# Patient Record
Sex: FEMALE | Race: WHITE | Employment: OTHER | URBAN - METROPOLITAN AREA
[De-identification: names, ages, dates, MRNs, and addresses within clinical notes are randomized per-mention and may not be internally consistent; named-entity substitution may affect disease eponyms.]

---

## 2018-05-04 ENCOUNTER — OFFICE VISIT (OUTPATIENT)
Dept: ORTHOPEDIC SURGERY | Age: 58
End: 2018-05-04

## 2018-05-04 VITALS
DIASTOLIC BLOOD PRESSURE: 80 MMHG | BODY MASS INDEX: 32.56 KG/M2 | WEIGHT: 202.6 LBS | SYSTOLIC BLOOD PRESSURE: 135 MMHG | HEIGHT: 66 IN | HEART RATE: 78 BPM

## 2018-05-04 DIAGNOSIS — M25.562 LEFT KNEE PAIN, UNSPECIFIED CHRONICITY: Primary | ICD-10-CM

## 2018-05-04 DIAGNOSIS — M17.12 PRIMARY OSTEOARTHRITIS OF LEFT KNEE: ICD-10-CM

## 2018-05-04 DIAGNOSIS — M23.204 DEGENERATIVE TEAR OF MEDIAL MENISCUS OF LEFT KNEE: ICD-10-CM

## 2018-05-04 PROCEDURE — 99203 OFFICE O/P NEW LOW 30 MIN: CPT | Performed by: NURSE PRACTITIONER

## 2018-05-04 PROCEDURE — L1810 KO ELASTIC WITH JOINTS: HCPCS | Performed by: NURSE PRACTITIONER

## 2018-05-04 RX ORDER — ATORVASTATIN CALCIUM 10 MG/1
10 TABLET, FILM COATED ORAL DAILY
Status: ON HOLD | COMMUNITY
End: 2022-09-27 | Stop reason: HOSPADM

## 2018-05-04 RX ORDER — IBUPROFEN 200 MG
200 TABLET ORAL EVERY 6 HOURS PRN
Qty: 120 TABLET | Refills: 0 | Status: ON HOLD | OUTPATIENT
Start: 2018-05-04 | End: 2022-09-27 | Stop reason: HOSPADM

## 2018-05-04 RX ORDER — GLIMEPIRIDE 4 MG/1
4 TABLET ORAL
Status: ON HOLD | COMMUNITY
End: 2022-09-27 | Stop reason: HOSPADM

## 2018-05-04 RX ORDER — INSULIN GLARGINE 100 [IU]/ML
INJECTION, SOLUTION SUBCUTANEOUS NIGHTLY
Status: ON HOLD | COMMUNITY
End: 2022-09-27 | Stop reason: HOSPADM

## 2018-05-04 RX ORDER — LISINOPRIL 10 MG/1
10 TABLET ORAL DAILY
Status: ON HOLD | COMMUNITY
End: 2022-09-27 | Stop reason: HOSPADM

## 2018-05-23 ENCOUNTER — OFFICE VISIT (OUTPATIENT)
Dept: ORTHOPEDIC SURGERY | Age: 58
End: 2018-05-23

## 2018-05-23 VITALS — WEIGHT: 210 LBS | HEIGHT: 66 IN | BODY MASS INDEX: 33.75 KG/M2

## 2018-05-23 DIAGNOSIS — M17.12 PRIMARY OSTEOARTHRITIS OF LEFT KNEE: ICD-10-CM

## 2018-05-23 DIAGNOSIS — M79.671 RIGHT FOOT PAIN: ICD-10-CM

## 2018-05-23 DIAGNOSIS — S83.232A COMPLEX TEAR OF MEDIAL MENISCUS OF LEFT KNEE AS CURRENT INJURY, INITIAL ENCOUNTER: Primary | ICD-10-CM

## 2018-05-23 PROCEDURE — 99213 OFFICE O/P EST LOW 20 MIN: CPT | Performed by: PHYSICIAN ASSISTANT

## 2018-05-23 PROCEDURE — G8428 CUR MEDS NOT DOCUMENT: HCPCS | Performed by: PHYSICIAN ASSISTANT

## 2018-05-23 PROCEDURE — 3017F COLORECTAL CA SCREEN DOC REV: CPT | Performed by: PHYSICIAN ASSISTANT

## 2018-05-23 PROCEDURE — G8417 CALC BMI ABV UP PARAM F/U: HCPCS | Performed by: PHYSICIAN ASSISTANT

## 2018-05-23 PROCEDURE — 4004F PT TOBACCO SCREEN RCVD TLK: CPT | Performed by: PHYSICIAN ASSISTANT

## 2022-09-26 ENCOUNTER — APPOINTMENT (OUTPATIENT)
Dept: CT IMAGING | Age: 62
DRG: 134 | End: 2022-09-26
Payer: COMMERCIAL

## 2022-09-26 ENCOUNTER — HOSPITAL ENCOUNTER (INPATIENT)
Age: 62
LOS: 2 days | Discharge: HOME | DRG: 134 | End: 2022-09-28
Attending: EMERGENCY MEDICINE | Admitting: INTERNAL MEDICINE
Payer: COMMERCIAL

## 2022-09-26 DIAGNOSIS — I82.402 ACUTE DEEP VEIN THROMBOSIS (DVT) OF LEFT LOWER EXTREMITY, UNSPECIFIED VEIN (HCC): ICD-10-CM

## 2022-09-26 DIAGNOSIS — E87.20 METABOLIC ACIDOSIS: ICD-10-CM

## 2022-09-26 DIAGNOSIS — E11.65 HYPERGLYCEMIA DUE TO DIABETES MELLITUS (HCC): ICD-10-CM

## 2022-09-26 DIAGNOSIS — I26.94 MULTIPLE SUBSEGMENTAL PULMONARY EMBOLI WITHOUT ACUTE COR PULMONALE (HCC): Primary | ICD-10-CM

## 2022-09-26 PROBLEM — I26.99 BILATERAL PULMONARY EMBOLISM (HCC): Status: ACTIVE | Noted: 2022-09-26

## 2022-09-26 LAB
A/G RATIO: 1.1 (ref 1.1–2.2)
ALBUMIN SERPL-MCNC: 3.7 G/DL (ref 3.4–5)
ALP BLD-CCNC: 104 U/L (ref 40–129)
ALT SERPL-CCNC: 13 U/L (ref 10–40)
ANION GAP SERPL CALCULATED.3IONS-SCNC: 14 MMOL/L (ref 3–16)
APTT: 27.4 SEC (ref 23–34.3)
AST SERPL-CCNC: 15 U/L (ref 15–37)
BASE EXCESS VENOUS: -2.6 MMOL/L (ref -3–3)
BASOPHILS ABSOLUTE: 0.1 K/UL (ref 0–0.2)
BASOPHILS RELATIVE PERCENT: 1.1 %
BETA-HYDROXYBUTYRATE: 0.3 MMOL/L (ref 0–0.27)
BILIRUB SERPL-MCNC: 0.4 MG/DL (ref 0–1)
BUN BLDV-MCNC: 27 MG/DL (ref 7–20)
CALCIUM SERPL-MCNC: 9.1 MG/DL (ref 8.3–10.6)
CARBOXYHEMOGLOBIN: 1.7 % (ref 0–1.5)
CHLORIDE BLD-SCNC: 97 MMOL/L (ref 99–110)
CO2: 19 MMOL/L (ref 21–32)
CREAT SERPL-MCNC: 1.9 MG/DL (ref 0.6–1.2)
D DIMER: 7.85 UG/ML FEU (ref 0–0.6)
EKG ATRIAL RATE: 85 BPM
EKG DIAGNOSIS: NORMAL
EKG P AXIS: 57 DEGREES
EKG P-R INTERVAL: 172 MS
EKG Q-T INTERVAL: 368 MS
EKG QRS DURATION: 84 MS
EKG QTC CALCULATION (BAZETT): 437 MS
EKG R AXIS: -52 DEGREES
EKG T AXIS: 121 DEGREES
EKG VENTRICULAR RATE: 85 BPM
EOSINOPHILS ABSOLUTE: 0.1 K/UL (ref 0–0.6)
EOSINOPHILS RELATIVE PERCENT: 1.9 %
GFR AFRICAN AMERICAN: 32
GFR NON-AFRICAN AMERICAN: 27
GLUCOSE BLD-MCNC: 184 MG/DL (ref 70–99)
GLUCOSE BLD-MCNC: 370 MG/DL (ref 70–99)
GLUCOSE BLD-MCNC: 444 MG/DL (ref 70–99)
GLUCOSE BLD-MCNC: 536 MG/DL (ref 70–99)
GLUCOSE BLD-MCNC: 610 MG/DL (ref 70–99)
HCO3 VENOUS: 22.8 MMOL/L (ref 23–29)
HCT VFR BLD CALC: 41.1 % (ref 36–48)
HEMOGLOBIN: 13.7 G/DL (ref 12–16)
INR BLD: 1 (ref 0.87–1.14)
LYMPHOCYTES ABSOLUTE: 1 K/UL (ref 1–5.1)
LYMPHOCYTES RELATIVE PERCENT: 12.9 %
MCH RBC QN AUTO: 29.9 PG (ref 26–34)
MCHC RBC AUTO-ENTMCNC: 33.3 G/DL (ref 31–36)
MCV RBC AUTO: 89.9 FL (ref 80–100)
METHEMOGLOBIN VENOUS: 0.3 %
MONOCYTES ABSOLUTE: 0.5 K/UL (ref 0–1.3)
MONOCYTES RELATIVE PERCENT: 7.1 %
NEUTROPHILS ABSOLUTE: 5.8 K/UL (ref 1.7–7.7)
NEUTROPHILS RELATIVE PERCENT: 77 %
O2 CONTENT, VEN: 12 VOL %
O2 SAT, VEN: 60 %
O2 THERAPY: ABNORMAL
PCO2, VEN: 41.8 MMHG (ref 40–50)
PDW BLD-RTO: 12.8 % (ref 12.4–15.4)
PERFORMED ON: ABNORMAL
PH VENOUS: 7.36 (ref 7.35–7.45)
PLATELET # BLD: 169 K/UL (ref 135–450)
PMV BLD AUTO: 9.5 FL (ref 5–10.5)
PO2, VEN: 32.6 MMHG (ref 25–40)
POTASSIUM SERPL-SCNC: 4.6 MMOL/L (ref 3.5–5.1)
PROTHROMBIN TIME: 13.1 SEC (ref 11.7–14.5)
RBC # BLD: 4.57 M/UL (ref 4–5.2)
SODIUM BLD-SCNC: 130 MMOL/L (ref 136–145)
TCO2 CALC VENOUS: 24 MMOL/L
TOTAL PROTEIN: 7.2 G/DL (ref 6.4–8.2)
TROPONIN: <0.01 NG/ML
TROPONIN: <0.01 NG/ML
WBC # BLD: 7.6 K/UL (ref 4–11)

## 2022-09-26 PROCEDURE — 84484 ASSAY OF TROPONIN QUANT: CPT

## 2022-09-26 PROCEDURE — 36415 COLL VENOUS BLD VENIPUNCTURE: CPT

## 2022-09-26 PROCEDURE — 2580000003 HC RX 258

## 2022-09-26 PROCEDURE — 82010 KETONE BODYS QUAN: CPT

## 2022-09-26 PROCEDURE — 80053 COMPREHEN METABOLIC PANEL: CPT

## 2022-09-26 PROCEDURE — 71260 CT THORAX DX C+: CPT | Performed by: EMERGENCY MEDICINE

## 2022-09-26 PROCEDURE — 85610 PROTHROMBIN TIME: CPT

## 2022-09-26 PROCEDURE — 85025 COMPLETE CBC W/AUTO DIFF WBC: CPT

## 2022-09-26 PROCEDURE — 82803 BLOOD GASES ANY COMBINATION: CPT

## 2022-09-26 PROCEDURE — 6360000002 HC RX W HCPCS: Performed by: EMERGENCY MEDICINE

## 2022-09-26 PROCEDURE — 83036 HEMOGLOBIN GLYCOSYLATED A1C: CPT

## 2022-09-26 PROCEDURE — 93005 ELECTROCARDIOGRAM TRACING: CPT | Performed by: EMERGENCY MEDICINE

## 2022-09-26 PROCEDURE — 2580000003 HC RX 258: Performed by: EMERGENCY MEDICINE

## 2022-09-26 PROCEDURE — 93010 ELECTROCARDIOGRAM REPORT: CPT | Performed by: INTERNAL MEDICINE

## 2022-09-26 PROCEDURE — 2060000000 HC ICU INTERMEDIATE R&B

## 2022-09-26 PROCEDURE — 85730 THROMBOPLASTIN TIME PARTIAL: CPT

## 2022-09-26 PROCEDURE — 6370000000 HC RX 637 (ALT 250 FOR IP)

## 2022-09-26 PROCEDURE — 6360000004 HC RX CONTRAST MEDICATION: Performed by: EMERGENCY MEDICINE

## 2022-09-26 PROCEDURE — 6370000000 HC RX 637 (ALT 250 FOR IP): Performed by: EMERGENCY MEDICINE

## 2022-09-26 PROCEDURE — 99285 EMERGENCY DEPT VISIT HI MDM: CPT

## 2022-09-26 PROCEDURE — 85379 FIBRIN DEGRADATION QUANT: CPT

## 2022-09-26 RX ORDER — INSULIN GLARGINE 100 [IU]/ML
25 INJECTION, SOLUTION SUBCUTANEOUS NIGHTLY
Status: DISCONTINUED | OUTPATIENT
Start: 2022-09-26 | End: 2022-09-28 | Stop reason: HOSPADM

## 2022-09-26 RX ORDER — ACETAMINOPHEN 325 MG/1
650 TABLET ORAL EVERY 6 HOURS PRN
Status: DISCONTINUED | OUTPATIENT
Start: 2022-09-26 | End: 2022-09-28 | Stop reason: HOSPADM

## 2022-09-26 RX ORDER — ONDANSETRON 4 MG/1
4 TABLET, ORALLY DISINTEGRATING ORAL EVERY 8 HOURS PRN
Status: DISCONTINUED | OUTPATIENT
Start: 2022-09-26 | End: 2022-09-28 | Stop reason: HOSPADM

## 2022-09-26 RX ORDER — ACETAMINOPHEN 650 MG/1
650 SUPPOSITORY RECTAL EVERY 6 HOURS PRN
Status: DISCONTINUED | OUTPATIENT
Start: 2022-09-26 | End: 2022-09-28 | Stop reason: HOSPADM

## 2022-09-26 RX ORDER — SODIUM CHLORIDE 9 MG/ML
INJECTION, SOLUTION INTRAVENOUS PRN
Status: DISCONTINUED | OUTPATIENT
Start: 2022-09-26 | End: 2022-09-28 | Stop reason: HOSPADM

## 2022-09-26 RX ORDER — MAGNESIUM SULFATE IN WATER 40 MG/ML
2000 INJECTION, SOLUTION INTRAVENOUS PRN
Status: DISCONTINUED | OUTPATIENT
Start: 2022-09-26 | End: 2022-09-28 | Stop reason: HOSPADM

## 2022-09-26 RX ORDER — POLYETHYLENE GLYCOL 3350 17 G/17G
17 POWDER, FOR SOLUTION ORAL DAILY PRN
Status: DISCONTINUED | OUTPATIENT
Start: 2022-09-26 | End: 2022-09-28 | Stop reason: HOSPADM

## 2022-09-26 RX ORDER — POTASSIUM CHLORIDE 20 MEQ/1
40 TABLET, EXTENDED RELEASE ORAL PRN
Status: DISCONTINUED | OUTPATIENT
Start: 2022-09-26 | End: 2022-09-28 | Stop reason: HOSPADM

## 2022-09-26 RX ORDER — INSULIN LISPRO 100 [IU]/ML
0-4 INJECTION, SOLUTION INTRAVENOUS; SUBCUTANEOUS NIGHTLY
Status: DISCONTINUED | OUTPATIENT
Start: 2022-09-26 | End: 2022-09-28 | Stop reason: HOSPADM

## 2022-09-26 RX ORDER — SODIUM CHLORIDE 9 MG/ML
INJECTION, SOLUTION INTRAVENOUS CONTINUOUS
Status: DISCONTINUED | OUTPATIENT
Start: 2022-09-26 | End: 2022-09-28 | Stop reason: HOSPADM

## 2022-09-26 RX ORDER — HEPARIN SODIUM 1000 [USP'U]/ML
5400 INJECTION, SOLUTION INTRAVENOUS; SUBCUTANEOUS PRN
Status: DISCONTINUED | OUTPATIENT
Start: 2022-09-26 | End: 2022-09-28 | Stop reason: HOSPADM

## 2022-09-26 RX ORDER — SODIUM CHLORIDE 0.9 % (FLUSH) 0.9 %
5-40 SYRINGE (ML) INJECTION EVERY 12 HOURS SCHEDULED
Status: DISCONTINUED | OUTPATIENT
Start: 2022-09-26 | End: 2022-09-28 | Stop reason: HOSPADM

## 2022-09-26 RX ORDER — 0.9 % SODIUM CHLORIDE 0.9 %
1000 INTRAVENOUS SOLUTION INTRAVENOUS ONCE
Status: COMPLETED | OUTPATIENT
Start: 2022-09-26 | End: 2022-09-26

## 2022-09-26 RX ORDER — POTASSIUM CHLORIDE 7.45 MG/ML
10 INJECTION INTRAVENOUS PRN
Status: DISCONTINUED | OUTPATIENT
Start: 2022-09-26 | End: 2022-09-28 | Stop reason: HOSPADM

## 2022-09-26 RX ORDER — INSULIN LISPRO 100 [IU]/ML
0-16 INJECTION, SOLUTION INTRAVENOUS; SUBCUTANEOUS
Status: DISCONTINUED | OUTPATIENT
Start: 2022-09-27 | End: 2022-09-28 | Stop reason: HOSPADM

## 2022-09-26 RX ORDER — HEPARIN SODIUM 1000 [USP'U]/ML
5400 INJECTION, SOLUTION INTRAVENOUS; SUBCUTANEOUS ONCE
Status: COMPLETED | OUTPATIENT
Start: 2022-09-26 | End: 2022-09-26

## 2022-09-26 RX ORDER — ONDANSETRON 2 MG/ML
4 INJECTION INTRAMUSCULAR; INTRAVENOUS EVERY 6 HOURS PRN
Status: DISCONTINUED | OUTPATIENT
Start: 2022-09-26 | End: 2022-09-28 | Stop reason: HOSPADM

## 2022-09-26 RX ORDER — SODIUM CHLORIDE 0.9 % (FLUSH) 0.9 %
5-40 SYRINGE (ML) INJECTION PRN
Status: DISCONTINUED | OUTPATIENT
Start: 2022-09-26 | End: 2022-09-28 | Stop reason: HOSPADM

## 2022-09-26 RX ORDER — HEPARIN SODIUM 1000 [USP'U]/ML
2700 INJECTION, SOLUTION INTRAVENOUS; SUBCUTANEOUS PRN
Status: DISCONTINUED | OUTPATIENT
Start: 2022-09-26 | End: 2022-09-28 | Stop reason: HOSPADM

## 2022-09-26 RX ORDER — HEPARIN SODIUM 10000 [USP'U]/100ML
1140 INJECTION, SOLUTION INTRAVENOUS CONTINUOUS
Status: DISCONTINUED | OUTPATIENT
Start: 2022-09-26 | End: 2022-09-28

## 2022-09-26 RX ORDER — DEXTROSE MONOHYDRATE 100 MG/ML
INJECTION, SOLUTION INTRAVENOUS CONTINUOUS PRN
Status: DISCONTINUED | OUTPATIENT
Start: 2022-09-26 | End: 2022-09-28 | Stop reason: HOSPADM

## 2022-09-26 RX ADMIN — HEPARIN SODIUM 1210 UNITS/HR: 10000 INJECTION, SOLUTION INTRAVENOUS at 17:48

## 2022-09-26 RX ADMIN — INSULIN GLARGINE 25 UNITS: 100 INJECTION, SOLUTION SUBCUTANEOUS at 23:41

## 2022-09-26 RX ADMIN — INSULIN LISPRO 4 UNITS: 100 INJECTION, SOLUTION INTRAVENOUS; SUBCUTANEOUS at 23:42

## 2022-09-26 RX ADMIN — SODIUM CHLORIDE 1000 ML: 9 INJECTION, SOLUTION INTRAVENOUS at 15:18

## 2022-09-26 RX ADMIN — IOPAMIDOL 75 ML: 755 INJECTION, SOLUTION INTRAVENOUS at 15:47

## 2022-09-26 RX ADMIN — INSULIN HUMAN 10 UNITS: 100 INJECTION, SOLUTION PARENTERAL at 17:04

## 2022-09-26 RX ADMIN — SODIUM CHLORIDE: 9 INJECTION, SOLUTION INTRAVENOUS at 23:49

## 2022-09-26 RX ADMIN — Medication 10 ML: at 23:38

## 2022-09-26 RX ADMIN — HEPARIN SODIUM 5400 UNITS: 1000 INJECTION INTRAVENOUS; SUBCUTANEOUS at 17:44

## 2022-09-26 ASSESSMENT — ENCOUNTER SYMPTOMS
CHEST TIGHTNESS: 0
COUGH: 0
SHORTNESS OF BREATH: 1
CHOKING: 0
WHEEZING: 0
STRIDOR: 0
NAUSEA: 0
DIARRHEA: 0
ABDOMINAL PAIN: 0

## 2022-09-26 ASSESSMENT — LIFESTYLE VARIABLES
HOW MANY STANDARD DRINKS CONTAINING ALCOHOL DO YOU HAVE ON A TYPICAL DAY: PATIENT DOES NOT DRINK
HOW OFTEN DO YOU HAVE A DRINK CONTAINING ALCOHOL: NEVER

## 2022-09-26 NOTE — ED NOTES
Called [facility] @2657 to speak with DR. Bedoya for DR. Mae Manual  09/26/22 6839

## 2022-09-26 NOTE — ED NOTES
[facility] will transport the PT [facility] @ 2030 if not sooner. Tried to call [employer] with no answer.       Nate Job  09/26/22 1087

## 2022-09-26 NOTE — ED NOTES
@8810 called [address] [facility] back and left a voicemail to call us back regarding this PT. Md aware.       Astrid Harding  09/26/22 1738

## 2022-09-26 NOTE — ED PROVIDER NOTES
[address] [facility]      Pt Name: Waldo Loaiza  MRN: [id number]  [place] 1960  Date of evaluation: 9/26/2022  Provider: Shruti Coronado MD    [address] [place] [address]       Chief Complaint   Patient presents with    Leg Pain     LLE pain and swelling. HISTORY OF PRESENT ILLNESS   (Location/Symptom, Timing/Onset, Context/Setting, Quality, Duration, Modifying Factors, Severity)  Note limiting factors. Waldo Loaiza is a 60 y.o. female who presents to the emergency department     Patient is 60years old very pleasant in nature presents emergency department with a history of some leg swelling for the last 4 to 5 days plus in the left calf area  She does complain of some shortness of breath that also started about the same time  She is a noncompliant diabetic she is supposed to be on Lantus insulin 24 units in the morning but she has not taken it for couple of months. She also was on Trulicity which she has not taken  She did go to her primary care doctor's office and her blood sugar I believe was around 342  Patient has had no signs of DKA for she is not having any nausea vomiting epigastric pain all of her discomfort really is in the left leg area she does have a history of previous ACL surgery in the past but it was a long time ago quite remote  She denies any recent immobilization that she offered but of concern is she does admit to shortness of breath. The patient denies any previous DVT  Denies hemoptysis denies productive cough  Denies abdominal pain denies cancer      The history is provided by the patient. Nursing Notes were reviewed. REVIEW OF SYSTEMS    (2-9 systems for level 4, 10 or more for level 5)     Review of Systems   Constitutional:  Positive for activity change. Negative for appetite change, chills, diaphoresis, fatigue and fever. HENT:  Negative for congestion. Eyes:  Negative for visual disturbance.    Respiratory:  Positive for shortness of breath. Negative for cough, choking, chest tightness, wheezing and stridor. Cardiovascular:  Positive for leg swelling. Negative for chest pain and palpitations. Gastrointestinal:  Negative for abdominal pain, diarrhea and nausea. Genitourinary:  Negative for flank pain. Skin:  Negative for rash and wound. Allergic/Immunologic: Negative for immunocompromised state. Neurological:  Negative for syncope, weakness, light-headedness and numbness. All other systems reviewed and are negative. Except as noted above the remainder of the review of systems was reviewed and negative. PAST MEDICAL HISTORY   No past medical history on file. SURGICAL HISTORY     No past surgical history on file. CURRENT MEDICATIONS       Previous Medications    ATORVASTATIN (LIPITOR) 10 MG TABLET    Take 10 mg by mouth daily    GLIMEPIRIDE (AMARYL) 4 MG TABLET    Take 4 mg by mouth every morning (before breakfast)    IBUPROFEN (ADVIL) 200 MG TABLET    Take 1 tablet by mouth every 6 hours as needed for Pain    INSULIN GLARGINE (LANTUS) 100 UNIT/ML INJECTION VIAL    Inject into the skin nightly    LISINOPRIL (PRINIVIL;ZESTRIL) 10 MG TABLET    Take 10 mg by mouth daily       ALLERGIES     Sulfa antibiotics    FAMILY HISTORY     No family history on file.        SOCIAL HISTORY       Social History     Socioeconomic History    Marital status: Single   Tobacco Use    Smoking status: Never    Smokeless tobacco: Current   Substance and Sexual Activity    Alcohol use: No    Drug use: No       SCREENINGS    Lyndsay Coma Scale  Eye Opening: Spontaneous  Best Verbal Response: Oriented  Best Motor Response: Obeys commands  Lyndsay Coma Scale Score: 15          PHYSICAL EXAM    (up to 7 for level 4, 8 or more for level 5)     ED Triage Vitals   BP Temp Temp src Pulse Resp SpO2 Height Weight   -- -- -- -- -- -- -- --   Patient Vitals for the past 24 hrs:   BP Temp Temp src Pulse Resp SpO2 Height Weight   09/26/22 1630 -- -- -- -- -- -- 5' 6\" (1.676 m) --   09/26/22 1626 125/84 -- -- 76 -- 94 % -- 175 lb (79.4 kg)   09/26/22 1430 132/71 98.4 °F (36.9 °C) Oral 77 22 100 % -- --         Physical Exam  Vitals and nursing note reviewed. Constitutional:       General: She is not in acute distress. Appearance: Normal appearance. She is well-developed. She is not diaphoretic. HENT:      Head: Normocephalic. Right Ear: Ear canal and external ear normal.      Left Ear: Ear canal and external ear normal.      Nose: Nose normal.      Mouth/Throat:      Mouth: Mucous membranes are moist.   Eyes:      Conjunctiva/sclera: Conjunctivae normal.      Pupils: Pupils are equal, round, and reactive to light. Neck:      Thyroid: No thyromegaly. Cardiovascular:      Rate and Rhythm: Normal rate and regular rhythm. Heart sounds: Normal heart sounds. No murmur heard. No friction rub. No gallop. Pulmonary:      Effort: Pulmonary effort is normal. No respiratory distress. Breath sounds: Normal breath sounds. Abdominal:      General: Bowel sounds are normal. There is no distension. Palpations: Abdomen is soft. Tenderness: There is no abdominal tenderness. Musculoskeletal:         General: Swelling and tenderness present. Cervical back: Normal range of motion and neck supple. Left lower leg: Edema present. Skin:     General: Skin is warm. Neurological:      Mental Status: She is alert and oriented to person, place, and time. GCS: GCS eye subscore is 4. GCS verbal subscore is 5. GCS motor subscore is 6. Cranial Nerves: No cranial nerve deficit. Sensory: No sensory deficit. Motor: No abnormal muscle tone.       Coordination: Coordination normal.      Deep Tendon Reflexes: Reflexes normal.   Psychiatric:         Behavior: Behavior normal.       DIAGNOSTIC RESULTS     EKG: All EKG's are interpreted by the Emergency Department Physician who either signs or Co-signs this chart in the absence of a cardiologist.  Rate is 85  Rhythm sinus  Left axis deviation. No acute ischemic or injury pattern some mild nonspecific changes. But nothing acute or diagnostic  Normal sinus rhythm      RADIOLOGY:   Non-plain film images such as CT, Ultrasound and MRI are read by the radiologist. Plain radiographic images are visualized and preliminarily interpreted by the emergency physician with the below findings:        Interpretation per the Radiologist below, if available at the time of this note:    CT CHEST PULMONARY EMBOLISM W CONTRAST   Final Result   Filling defects in the bilateral lower lobes as well as the right middle and   upper lobe pulmonary arterial branches, consistent with pulmonary embolism. No evidence of right heart strain. Findings were discussed with Jennifer Cox at 4:05 pm on 9/26/2022.                  LABS:  Results for orders placed or performed during the hospital encounter of 09/26/22   CBC with Auto Differential   Result Value Ref Range    WBC 7.6 4.0 - 11.0 K/uL    RBC 4.57 4.00 - 5.20 M/uL    Hemoglobin 13.7 12.0 - 16.0 g/dL    Hematocrit 41.1 36.0 - 48.0 %    MCV 89.9 80.0 - 100.0 fL    MCH 29.9 26.0 - 34.0 pg    MCHC 33.3 31.0 - 36.0 g/dL    RDW 12.8 12.4 - 15.4 %    Platelets 985 243 - 136 K/uL    MPV 9.5 5.0 - 10.5 fL    Neutrophils % 77.0 %    Lymphocytes % 12.9 %    Monocytes % 7.1 %    Eosinophils % 1.9 %    Basophils % 1.1 %    Neutrophils Absolute 5.8 1.7 - 7.7 K/uL    Lymphocytes Absolute 1.0 1.0 - 5.1 K/uL    Monocytes Absolute 0.5 0.0 - 1.3 K/uL    Eosinophils Absolute 0.1 0.0 - 0.6 K/uL    Basophils Absolute 0.1 0.0 - 0.2 K/uL   Comprehensive Metabolic Panel   Result Value Ref Range    Sodium 130 (L) 136 - 145 mmol/L    Potassium 4.6 3.5 - 5.1 mmol/L    Chloride 97 (L) 99 - 110 mmol/L    CO2 19 (L) 21 - 32 mmol/L    Anion Gap 14 3 - 16    Glucose 610 (HH) 70 - 99 mg/dL    BUN 27 (H) 7 - 20 mg/dL    Creatinine 1.9 (H) 0.6 - 1.2 mg/dL    GFR Non-African American 27 (A) >60    GFR  32 (A) >60    Calcium 9.1 8.3 - 10.6 mg/dL    Total Protein 7.2 6.4 - 8.2 g/dL    Albumin 3.7 3.4 - 5.0 g/dL    Albumin/Globulin Ratio 1.1 1.1 - 2.2    Total Bilirubin 0.4 0.0 - 1.0 mg/dL    Alkaline Phosphatase 104 40 - 129 U/L    ALT 13 10 - 40 U/L    AST 15 15 - 37 U/L   Protime-INR   Result Value Ref Range    Protime 13.1 11.7 - 14.5 sec    INR 1.00 0.87 - 1.14   D-Dimer, Quantitative   Result Value Ref Range    D-Dimer, Quant 7.85 (H) 0.00 - 0.60 ug/mL FEU   Troponin   Result Value Ref Range    Troponin <0.01 <0.01 ng/mL   Blood Gas, Venous   Result Value Ref Range    pH, Micheal 7.355 7.350 - 7.450    pCO2, Micheal 41.8 40.0 - 50.0 mmHg    pO2, Micheal 32.6 25.0 - 40.0 mmHg    HCO3, Venous 22.8 (L) 23.0 - 29.0 mmol/L    Base Excess, Micheal -2.6 -3.0 - 3.0 mmol/L    O2 Sat, Micheal 60 Not Established %    Carboxyhemoglobin 1.7 (H) 0.0 - 1.5 %    MetHgb, Micheal 0.3 <1.5 %    TC02 (Calc), Micheal 24 Not Established mmol/L    O2 Content, Micheal 12 Not Established VOL %    O2 Therapy Unknown    APTT   Result Value Ref Range    aPTT 27.4 23.0 - 34.3 sec   POCT Glucose   Result Value Ref Range    POC Glucose 536 (H) 70 - 99 mg/dl    Performed on ACCU-CHEK    EKG 12 Lead   Result Value Ref Range    Ventricular Rate 85 BPM    Atrial Rate 85 BPM    P-R Interval 172 ms    QRS Duration 84 ms    Q-T Interval 368 ms    QTc Calculation (Bazett) 437 ms    P Axis 57 degrees    R Axis -52 degrees    T Axis 121 degrees    Diagnosis       Normal sinus rhythmPossible Left atrial enlargementLeft axis deviationInferior infarct , age undeterminedAnterior infarct , age undeterminedNonspecific ST abnormalityAbnormal ECGNo previous ECGs availableConfirmed by SHAILESH Negron MD ([id number]) on 9/26/2022 4:42:54 PM              EMERGENCY DEPARTMENT COURSE and DIFFERENTIAL DIAGNOSIS/MDM:     Vitals:    09/26/22 1430 09/26/22 1626 09/26/22 1630   BP: 132/71 125/84    Pulse: 77 76    Resp: 22     Temp: 98.4 °F (36.9 °C)     TempSrc: Oral     SpO2: 100% 94% Weight:  175 lb (79.4 kg)    Height:   5' 6\" (1.676 m)           MDM        REASSESSMENT      Patient has remained stable. I did discuss the case in detail with her ophthalmologist who felt that it was safe to proceed with heparinization and anticoagulation as needed    CRITICAL CARE TIME   This patient underwent 50 minutes of critical care time patient presented with severe left leg pain and swelling that she thought was due to a snake bite. We determined by getting a detailed history that she never was bitten by a snake. She just assumed that because she thought she after taking a walk in the woods heard maybe a snake crawling near her but she never saw a snake and she never felt a bite  Looking at her she does have diffuse swelling throughout the left calf area it is swollen she has no obvious precipitating cause she did have a recent UTI and was placed on Macrobid and Pyridium she said that that helped she has had some UTI dysuria and pressure feeling for many years. 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 She was referred here for the leg pain with the history of having a little shortness of breath I did go ahead and did do a CT and D-dimer unfortunately her D-dimer came back extremely high at 7 and her CT was positive for pulmonary embolism  Unfortunately also of significant importance is the fact that she is a diabetic she is supposed to be on Lantus but has been noncompliant for some time her blood sugars 670 and she has a slight acidosis. We did send a beta hydroxybutyrate down to [facility] but that is still pending a venous blood gas reveals a minimal acidosis. Patient was given intravenous heparin both bolus and drip  Patient was also given intravenous insulin followed by subcutaneous 20 units.   Once again a total of [phone] [facility], [address]

## 2022-09-26 NOTE — ED NOTES
[facility] called back [address] [place] [address]  09/26/22 1262

## 2022-09-26 NOTE — PLAN OF CARE
Admit to PCU from [place]. [place]    Bilateral PE and likely LLE DVT given pain, swelling and elevated DDimer. Heparin gtt initiated in ED. Patient is a . She has a history of retinal neovascularization s/p laser surgery with history of retinal bleed. Despite this, AC is recommended given life-threatening situation. Severe hyperglycemia 2/2 medical non-adherence. Received regular insulin in the ED. Does not appear to be in DKA despite BG of 610. Initiate lantus 25 nightly and high dose SSI. Elevated Cr. OSMAN vs CKD, do not have previous values to compare. Plan of care discussed with Dr. Lurdes Saldana.     Tj Rosario PA-C  9/26/2022 5:38 PM

## 2022-09-26 NOTE — ED NOTES
Called [facility] to start transfer to [facility] Sir Rahul Payne  09/26/22 8219

## 2022-09-26 NOTE — PROGRESS NOTES
Pharmacy to Manage Heparin Infusion per [facility]    Dx:  PE  Pt wt = 79.4 (will use adjusted wt of 67.3 kg - actual body weight > 120% ideal body weight). Baseline aPTT = 27.4 at 14:45. Oral factor Xa-inhibitors may alter and elevate anti-Xa levels used for unfractionated heparin monitoring. As a result, anti-Xa monitoring is not accurate while Xa-inhibitor activity is detectable. Utilize aPTT monitoring when patient received an oral factor Xa-inhibitor (apixaban, betrixaban, edoxaban or rivaroxaban) within 72 hours prior to admission (please document last administration time). The goal is to allow a washout of oral factor Xa-inhibitors by using aPTT for 72 hours, then change to ant-Xa levels for UFH. Heparin (weight-based) Infusion: VTE/DVT/PE  Heparin 80 units/kg IVP bolus (max 10,000 units) followed by Heparin infusion at 18 units/kg/hr (recommended max initial rate: 2100 units/hr). Recheck anti-Xa (unless aPTT being used) in 6 hours. Goal anti-Xa 0.3-0.7 IU/mL  Goal aPTT =  seconds. Heparin bolus = 5400 units;  initial Heparin infusion at 1210 units/hr. Will monitor with APTT while patient at [facility] [place]. [place] (Anti-Xa levels cannot be done at [facility] and changed to Anti-Xa if admitted to [facility].   Miya Branch R.Ph.9/26/20224:53 PM

## 2022-09-27 ENCOUNTER — APPOINTMENT (OUTPATIENT)
Dept: VASCULAR LAB | Age: 62
DRG: 134 | End: 2022-09-27
Payer: COMMERCIAL

## 2022-09-27 PROBLEM — R01.1 HEART MURMUR: Status: ACTIVE | Noted: 2022-09-27

## 2022-09-27 PROBLEM — I26.94 MULTIPLE SUBSEGMENTAL PULMONARY EMBOLI WITHOUT ACUTE COR PULMONALE (HCC): Status: ACTIVE | Noted: 2022-09-26

## 2022-09-27 PROBLEM — I82.402 ACUTE DEEP VEIN THROMBOSIS (DVT) OF LEFT LOWER EXTREMITY (HCC): Status: ACTIVE | Noted: 2022-09-27

## 2022-09-27 PROBLEM — E87.1 HYPONATREMIA: Status: ACTIVE | Noted: 2022-09-27

## 2022-09-27 PROBLEM — R60.0 LEG EDEMA: Status: ACTIVE | Noted: 2022-09-27

## 2022-09-27 PROBLEM — N18.32 STAGE 3B CHRONIC KIDNEY DISEASE (HCC): Status: ACTIVE | Noted: 2022-09-27

## 2022-09-27 PROBLEM — E11.65 HYPERGLYCEMIA DUE TO DIABETES MELLITUS (HCC): Status: ACTIVE | Noted: 2022-09-27

## 2022-09-27 LAB
ANION GAP SERPL CALCULATED.3IONS-SCNC: 13 MMOL/L (ref 3–16)
ANION GAP SERPL CALCULATED.3IONS-SCNC: 15 MMOL/L (ref 3–16)
ANTI-XA UNFRAC HEPARIN: 0.59 IU/ML (ref 0.3–0.7)
ANTI-XA UNFRAC HEPARIN: 0.62 IU/ML (ref 0.3–0.7)
ANTI-XA UNFRAC HEPARIN: 0.68 IU/ML (ref 0.3–0.7)
ANTI-XA UNFRAC HEPARIN: 0.75 IU/ML (ref 0.3–0.7)
BASOPHILS ABSOLUTE: 0.1 K/UL (ref 0–0.2)
BASOPHILS ABSOLUTE: 0.1 K/UL (ref 0–0.2)
BASOPHILS RELATIVE PERCENT: 0.9 %
BASOPHILS RELATIVE PERCENT: 1.1 %
BUN BLDV-MCNC: 24 MG/DL (ref 7–20)
BUN BLDV-MCNC: 27 MG/DL (ref 7–20)
CALCIUM SERPL-MCNC: 8.7 MG/DL (ref 8.3–10.6)
CALCIUM SERPL-MCNC: 8.9 MG/DL (ref 8.3–10.6)
CHLORIDE BLD-SCNC: 97 MMOL/L (ref 99–110)
CHLORIDE BLD-SCNC: 99 MMOL/L (ref 99–110)
CO2: 19 MMOL/L (ref 21–32)
CO2: 20 MMOL/L (ref 21–32)
CREAT SERPL-MCNC: 1.7 MG/DL (ref 0.6–1.2)
CREAT SERPL-MCNC: 1.8 MG/DL (ref 0.6–1.2)
EOSINOPHILS ABSOLUTE: 0.3 K/UL (ref 0–0.6)
EOSINOPHILS ABSOLUTE: 0.3 K/UL (ref 0–0.6)
EOSINOPHILS RELATIVE PERCENT: 4.4 %
EOSINOPHILS RELATIVE PERCENT: 5.2 %
ESTIMATED AVERAGE GLUCOSE: 378.1 MG/DL
GFR AFRICAN AMERICAN: 34
GFR AFRICAN AMERICAN: 37
GFR NON-AFRICAN AMERICAN: 28
GFR NON-AFRICAN AMERICAN: 30
GLUCOSE BLD-MCNC: 194 MG/DL (ref 70–99)
GLUCOSE BLD-MCNC: 206 MG/DL (ref 70–99)
GLUCOSE BLD-MCNC: 239 MG/DL (ref 70–99)
GLUCOSE BLD-MCNC: 312 MG/DL (ref 70–99)
GLUCOSE BLD-MCNC: 313 MG/DL (ref 70–99)
GLUCOSE BLD-MCNC: 492 MG/DL (ref 70–99)
HBA1C MFR BLD: 14.8 %
HCT VFR BLD CALC: 35.1 % (ref 36–48)
HCT VFR BLD CALC: 36.1 % (ref 36–48)
HEMOGLOBIN: 12.1 G/DL (ref 12–16)
HEMOGLOBIN: 12.4 G/DL (ref 12–16)
INFLUENZA A: NOT DETECTED
INFLUENZA B: NOT DETECTED
LYMPHOCYTES ABSOLUTE: 2.1 K/UL (ref 1–5.1)
LYMPHOCYTES ABSOLUTE: 2.1 K/UL (ref 1–5.1)
LYMPHOCYTES RELATIVE PERCENT: 31.3 %
LYMPHOCYTES RELATIVE PERCENT: 33.8 %
MCH RBC QN AUTO: 30 PG (ref 26–34)
MCH RBC QN AUTO: 30.1 PG (ref 26–34)
MCHC RBC AUTO-ENTMCNC: 34.3 G/DL (ref 31–36)
MCHC RBC AUTO-ENTMCNC: 34.3 G/DL (ref 31–36)
MCV RBC AUTO: 87.5 FL (ref 80–100)
MCV RBC AUTO: 87.6 FL (ref 80–100)
MONOCYTES ABSOLUTE: 0.5 K/UL (ref 0–1.3)
MONOCYTES ABSOLUTE: 0.5 K/UL (ref 0–1.3)
MONOCYTES RELATIVE PERCENT: 7.4 %
MONOCYTES RELATIVE PERCENT: 8.2 %
NEUTROPHILS ABSOLUTE: 3.2 K/UL (ref 1.7–7.7)
NEUTROPHILS ABSOLUTE: 3.7 K/UL (ref 1.7–7.7)
NEUTROPHILS RELATIVE PERCENT: 51.9 %
NEUTROPHILS RELATIVE PERCENT: 55.8 %
PDW BLD-RTO: 12.6 % (ref 12.4–15.4)
PDW BLD-RTO: 12.7 % (ref 12.4–15.4)
PERFORMED ON: ABNORMAL
PLATELET # BLD: 147 K/UL (ref 135–450)
PLATELET # BLD: 149 K/UL (ref 135–450)
PMV BLD AUTO: 8.9 FL (ref 5–10.5)
PMV BLD AUTO: 9.5 FL (ref 5–10.5)
POTASSIUM REFLEX MAGNESIUM: 4.1 MMOL/L (ref 3.5–5.1)
POTASSIUM REFLEX MAGNESIUM: 4.4 MMOL/L (ref 3.5–5.1)
RBC # BLD: 4.01 M/UL (ref 4–5.2)
RBC # BLD: 4.12 M/UL (ref 4–5.2)
SARS-COV-2 RNA, RT PCR: NOT DETECTED
SODIUM BLD-SCNC: 130 MMOL/L (ref 136–145)
SODIUM BLD-SCNC: 133 MMOL/L (ref 136–145)
TROPONIN: <0.01 NG/ML
WBC # BLD: 6.1 K/UL (ref 4–11)
WBC # BLD: 6.7 K/UL (ref 4–11)

## 2022-09-27 PROCEDURE — 2060000000 HC ICU INTERMEDIATE R&B

## 2022-09-27 PROCEDURE — 87636 SARSCOV2 & INF A&B AMP PRB: CPT

## 2022-09-27 PROCEDURE — 84484 ASSAY OF TROPONIN QUANT: CPT

## 2022-09-27 PROCEDURE — 85025 COMPLETE CBC W/AUTO DIFF WBC: CPT

## 2022-09-27 PROCEDURE — 36415 COLL VENOUS BLD VENIPUNCTURE: CPT

## 2022-09-27 PROCEDURE — 99255 IP/OBS CONSLTJ NEW/EST HI 80: CPT | Performed by: INTERNAL MEDICINE

## 2022-09-27 PROCEDURE — 6370000000 HC RX 637 (ALT 250 FOR IP)

## 2022-09-27 PROCEDURE — 85520 HEPARIN ASSAY: CPT

## 2022-09-27 PROCEDURE — 80048 BASIC METABOLIC PNL TOTAL CA: CPT

## 2022-09-27 PROCEDURE — 2580000003 HC RX 258

## 2022-09-27 PROCEDURE — 99219 PR INITIAL OBSERVATION CARE/DAY 50 MINUTES: CPT | Performed by: NURSE PRACTITIONER

## 2022-09-27 PROCEDURE — 93971 EXTREMITY STUDY: CPT

## 2022-09-27 PROCEDURE — 6360000002 HC RX W HCPCS: Performed by: EMERGENCY MEDICINE

## 2022-09-27 RX ORDER — GLUCOSAMINE HCL/CHONDROITIN SU 500-400 MG
CAPSULE ORAL
Qty: 100 STRIP | Refills: 1 | Status: SHIPPED | OUTPATIENT
Start: 2022-09-27

## 2022-09-27 RX ORDER — BLOOD-GLUCOSE METER
1 KIT MISCELLANEOUS DAILY
Qty: 1 KIT | Refills: 0 | Status: SHIPPED | OUTPATIENT
Start: 2022-09-27

## 2022-09-27 RX ORDER — INSULIN GLARGINE 100 [IU]/ML
25 INJECTION, SOLUTION SUBCUTANEOUS NIGHTLY
Qty: 5 ADJUSTABLE DOSE PRE-FILLED PEN SYRINGE | Refills: 3 | Status: SHIPPED | OUTPATIENT
Start: 2022-09-27

## 2022-09-27 RX ORDER — PEN NEEDLE, DIABETIC 31 GX5/16"
1 NEEDLE, DISPOSABLE MISCELLANEOUS DAILY
Qty: 100 EACH | Refills: 3 | Status: SHIPPED | OUTPATIENT
Start: 2022-09-27

## 2022-09-27 RX ORDER — LANCETS 30 GAUGE
1 EACH MISCELLANEOUS 2 TIMES DAILY
Qty: 100 EACH | Refills: 0 | Status: SHIPPED | OUTPATIENT
Start: 2022-09-27

## 2022-09-27 RX ADMIN — INSULIN LISPRO 4 UNITS: 100 INJECTION, SOLUTION INTRAVENOUS; SUBCUTANEOUS at 11:43

## 2022-09-27 RX ADMIN — HEPARIN SODIUM 1140 UNITS/HR: 10000 INJECTION, SOLUTION INTRAVENOUS at 16:14

## 2022-09-27 RX ADMIN — SODIUM CHLORIDE: 9 INJECTION, SOLUTION INTRAVENOUS at 10:49

## 2022-09-27 RX ADMIN — INSULIN LISPRO 4 UNITS: 100 INJECTION, SOLUTION INTRAVENOUS; SUBCUTANEOUS at 21:06

## 2022-09-27 RX ADMIN — SODIUM CHLORIDE: 9 INJECTION, SOLUTION INTRAVENOUS at 21:16

## 2022-09-27 RX ADMIN — INSULIN GLARGINE 25 UNITS: 100 INJECTION, SOLUTION SUBCUTANEOUS at 21:05

## 2022-09-27 RX ADMIN — INSULIN LISPRO 4 UNITS: 100 INJECTION, SOLUTION INTRAVENOUS; SUBCUTANEOUS at 08:12

## 2022-09-27 NOTE — PROGRESS NOTES
Pharmacy - RE:  High-dose Heparin drip  Current rate = 12.1 ml/h  (1210 units/h)  Anti-Xa drawn @ 0016 = 0.75 IU/ml  Goal Anti-Xa = 0.3 - 0.7 IU/ml  Per protocol, will decrease rate to 1140 units/hr and obtain another Anti-Xa 9/27 @ 0800.

## 2022-09-27 NOTE — ED NOTES
Patient  Report called to Mandeep Chavez, [facility].      Inez Jimenez RN  09/26/22 5317

## 2022-09-27 NOTE — PROGRESS NOTES
Dr. Ml Riggins in room and was able to convince pt. To stay for 24 hours. However Per pt.  She needs to be out of here by 9-10 am.

## 2022-09-27 NOTE — PROGRESS NOTES
Pharmacy - RE:  High-dose Heparin drip  Current rate = 11.4 ml/h  (1140 units/h)  Anti-Xa drawn @ 1500= 0.62 IU/ml  Goal Anti-Xa = 0.3 - 0.7 IU/ml  Per protocol, Continue with same rate. Re check Anti-Xa daily given two consecutive range. Rolo Nogueira Pharm D 2/87/20987:61 PM  .        .

## 2022-09-27 NOTE — PROGRESS NOTES
Pharmacy - RE:  High-dose Heparin drip  Current rate = 11.4 ml/h  (1140 units/h)  Anti-Xa drawn @ 0900= 0.68 IU/ml  Goal Anti-Xa = 0.3 - 0.7 IU/ml  Per protocol, Continue with same rate. Re check Anti-Xa@ [address] CH[address]ION 9/27/202210:41 AM  .

## 2022-09-27 NOTE — FLOWSHEET NOTE
09/27/22 0800   Vital Signs   Temp 97.1 °F (36.2 °C)   Temp Source Axillary   Heart Rate 73   Heart Rate Source Monitor   Resp 16   /78   BP Location Left upper arm   BP Method Automatic   MAP (Calculated) 96   Patient Position Semi fowlers   Level of Consciousness 0   MEWS Score 1   Pain Assessment   Pain Assessment None - Denies Pain   Oxygen Therapy   SpO2 95 %   O2 Device None (Room air)   Pt. Resting in bed. Call light in reach. Shift assessment completed see flow sheet. Denies any needs at this time. Will continue to monitor.

## 2022-09-27 NOTE — CONSULTS
Patient is being seen at the request of Clair Perkins MD  for a consultation for bilateral PE    HISTORY OF PRESENT ILLNESS:   60years old presented with leg swelling for 4- 5 days, left calf area. Associated with pain. Severe. Worse with walking. Associated with shortness of breath. No hemoptysis. CTPA in ED showed bilateral pulmonary embolism. No evidence of heart strain. No recent hospitalization and surgery. 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 No hormonal meds. No personal history of DVT PE or cancer. No FH for DVT or PE. No smoking since 1980s, smoked 10-15 years 1/2-1 ppd. PAST MEDICAL HISTORY:  Past Medical History:   Diagnosis Date    Cerebral artery occlusion with cerebral infarction ([facility] [place] [address]     Diabetes mellitus ([facility] [place] [address]     Hx of blood clots      PAST SURGICAL HISTORY:  History reviewed. No pertinent surgical history. FAMILY HISTORY:  family history includes Breast Cancer in her mother; Diabetes type 2  in her father. SOCIAL HISTORY:   reports that she has never smoked. She uses smokeless tobacco.    Scheduled Meds:   sodium chloride flush  5-40 mL IntraVENous 2 times per day    insulin glargine  25 Units SubCUTAneous Nightly    insulin lispro  0-16 Units SubCUTAneous TID WC    insulin lispro  0-4 Units SubCUTAneous Nightly     Continuous Infusions:   heparin (PORCINE) Infusion 1,140 Units/hr (09/27/22 0130)    dextrose      sodium chloride      sodium chloride 100 mL/hr at 09/26/22 2349     PRN Meds:  heparin (porcine), heparin (porcine), glucose, dextrose bolus **OR** dextrose bolus, glucagon (rDNA), dextrose, sodium chloride flush, sodium chloride, ondansetron **OR** ondansetron, polyethylene glycol, acetaminophen **OR** acetaminophen, potassium chloride **OR** potassium alternative oral replacement **OR** potassium chloride, magnesium sulfate    ALLERGIES:  Patient is allergic to sulfa antibiotics.     REVIEW OF SYSTEMS:  Constitutional: Negative for fever  HENT: Negative for sore throat  Eyes: Negative for redness   Respiratory: + SOB   Cardiovascular: Negative for chest pain  Gastrointestinal: Negative for vomiting, diarrhea   Genitourinary: Negative for hematuria   Musculoskeletal: Negative for arthralgias   Skin: Negative for rash  Neurological: Negative for syncope  Hematological: Negative for adenopathy  Psychiatric/Behavorial: Negative for anxiety    PHYSICAL EXAM:  Blood pressure 137/76, pulse 76, temperature 99 °F (37.2 °C), temperature source Oral, resp. rate 16, height 5' 6\" (1.676 m), weight 173 lb 4 oz (78.6 kg), SpO2 94 %.' on RA  Gen: No distress. Eyes: PERRL. No sclera icterus. No conjunctival injection. ENT: No discharge. Pharynx clear. Neck: Trachea midline. No obvious mass. Resp: No accessory muscle use. No crackles. No wheezes. No rhonchi. No dullness on percussion. CV: Regular rate. Regular rhythm. No murmur or rub. LLE  edema with calf tenderness. GI: Non-tender. Non-distended. No hernia. Skin: Warm and dry. No nodule on exposed extremities. Lymph: No cervical LAD. No supraclavicular LAD. M/S: No cyanosis. No joint deformity. No clubbing. Neuro: Awake. Alert. Moves all four extremities. Psych: Oriented x 3. No anxiety.      LABS:  CBC:   Recent Labs     09/26/22  1445 09/27/22  0016 09/27/22  0406   WBC 7.6 6.7 6.1   HGB 13.7 12.4 12.1   HCT 41.1 36.1 35.1*   MCV 89.9 87.5 87.6    147 149     BMP:   Recent Labs     09/26/22  1445 09/27/22  0016 09/27/22  0406   * 130* 133*   K 4.6 4.4 4.1   CL 97* 97* 99   CO2 19* 20* 19*   BUN 27* 27* 24*   CREATININE 1.9* 1.8* 1.7*     LIVER PROFILE:   Recent Labs     09/26/22  1445   AST 15   ALT 13   BILITOT 0.4   ALKPHOS 104     PT/INR:   Recent Labs     09/26/22  1445   PROTIME 13.1   INR 1.00     APTT:   Recent Labs     09/26/22  1445   APTT 27.4     UA:No results for input(s): NITRITE, COLORU, PHUR, LABCAST, WBCUA, RBCUA, MUCUS, TRICHOMONAS, YEAST, BACTERIA, CLARITYU, SPECGRAV, LEUKOCYTESUR, UROBILINOGEN, BILIRUBINUR, BLOODU, GLUCOSEU, AMORPHOUS in the last 72 hours. Invalid input(s): KETONESU  No results for input(s): PHART, FAL6WXL, PO2ART in the last 72 hours. CT chest 9/26 imaging was reviewed by me and showed   Filling defects in the bilateral lower lobes as well as the right middle and   upper lobe pulmonary arterial branches, consistent with pulmonary embolism. No evidence of right heart strain      ASSESSMENT:  Acute bilateral pulmonary embolism-probably provoked by daily 6 hours commute  LLE edema and pain -venous Doppler showed acute DVT  Heart murmur     PLAN:  Heparin drip- Consider outpatient novel oral anticoagulants, factor Xa inhibitors or direct thrombin inhibitors. Patient is hemodynamically stable and therefore no indication systemic thrombolysis or catheter-based therapies at this time. I recommend 3 months of anticoagulation and then reassessment. Cancer screening for age including colonoscopy, pap smear and mammogram if indicated.      Echocardiogram

## 2022-09-27 NOTE — CARE COORDINATION
Case Management Assessment  Initial Evaluation      Patient Name: Madeleine Brunner  YOB: 1960  Diagnosis: Metabolic acidosis [G88.6]  Bilateral pulmonary embolism (HCC) [I26.99]  Acute deep vein thrombosis (DVT) of left lower extremity, unspecified vein ([facility] [address] [I82.402]  Multiple subsegmental pulmonary emboli without acute cor pulmonale ([facility] [address] [I26.94]  Hyperglycemia due to diabetes mellitus ([facility] [address] [E11.65]  Date / Time: 9/26/2022  2:23 PM    Admission status/Date:09/26/2022 Inpatient   Chart Reviewed: Yes      Patient Interviewed: Yes   Family Interviewed:  No      Hospitalization in the last 30 days:  No    Health Care Decision Maker :   Primary Decision Maker: Kb Amaya - Brother/Sister - [phone]    (CM - must 1st enter selection under Navigator - emergency contact- [place] Relationship and pick relationship)   Who do you trust or have selected to make healthcare decisions for you    Met with:  pt   Interview conducted  (bedside/phone): bedside    Current PCP:  Pt stated she just scheduled an appointment with Dr. Doug Elaine on 10/7 (next Friday) for a new pt appointment.  Discussed the care clinic and she declined stating she doesn't need this as she already has an appointment scheduled     Financial  caresource  Precert required for SNF : Y          3 night stay required -  N    ADLS  Support Systems/Care Needs: None  Transportation: self    Meal Preparation: self    Housing  Living Arrangements: [place]   Steps: 1  Intent for return to present living arrangements: Yes  Identified Issues: [address] [facility] with [address] : No Agency:(Services)  Type of Home Care Services: Aide Services  Passport/Waiver : No  :                      Phone Number:    Passport/Waiver Services: [address]   DME Provider: n/a  Equipment: n/a    Home O2 Use :  No; on room air per vitals in epic (97%)    Critical access hospital Service Affiliation  Dialysis:  No    Agency:  Location:  Dialysis Schedule:  Phone:   Fax: Other Community Services: n/a    DISCHARGE PLAN: Explained Case Management role/services. Chart review completed. Order to Social work noted. Kirk Lozada with meds to beds stated pt has a $0 dollar co-pay for eliquis. Met with pt at bedside. Pt stated she is independent at home with her ADL's and will return when discharged. She stated she has very limited support from family/friends. She stated that she just scheduled an appointment with a new PCP for next Friday. She inquired about skilled nursing and she stated she is not homebound. Explained pt needs to be homebound for home care and she stated understanding. She is aware she can discuss with her new PCP if her homebound status changes which she stated understanding. Pt stated that [place] and needs to get home early tomorrow for her dog. She stated she is calling someone back to see if they can go check on her dog for her. Pt denied needs for CM. CM will follow at a distance. Please notify CM if needs or concerns arise.      Pearl Hussein MSW, NAIF

## 2022-09-27 NOTE — FLOWSHEET NOTE
Patient in bed with eyes closed. Awakened for vitals. VSS. Patient denies any further needs at this time. Call light within reach.

## 2022-09-27 NOTE — PROGRESS NOTES
Patient admitted to room 313 from [place] [place] . Patient oriented to room, call light, bed rails, phone, lights and bathroom. Patient instructed about the schedule of the day including: vital sign frequency, lab draws, possible tests, frequency of MD and staff rounds, daily weights, I &O's and prescribed diet. Telemetry box in place, patient aware of placement and reason. Bed locked, in lowest position, side rails up 2/4, call light within reach. Recliner Assessment  Patient is able to demonstrate the ability to move from a reclining position to an upright position within the recliner. 4 Eyes Skin Assessment     The patient is being assess for   Admission    I agree that 2 RN's have performed a thorough Head to Toe Skin Assessment on the patient. ALL assessment sites listed below have been assessed. Areas assessed for pressure by both nurses:   [x]   Head, Face, and Ears   [x]   Shoulders, Back, and Chest, Abdomen  [x]   Arms, Elbows, and Hands   [x]   Coccyx, Sacrum, and Ischium  [x]   Legs, Feet, and Heels    Scattered bruises and abrasions  , pt denies any skin issues to coccyx    Skin Assessed Under all Medical Devices by both nurses:  NA               All Mepilex Borders were peeled back and area peeked at by both nurses:  No: NA  Please list where Mepilex Borders are located:  NA             **SHARE this note so that the co-signing nurse is able to place an eSignature**    Co-signer eSignature: Electronically signed by Marek Hernandez RN on 9/26/22 at 11:20 PM EDT    Does the Patient have Skin Breakdown related to pressure?   No     (Insert Photo here)         Fernando Prevention initiated:  No   Wound Care Orders initiated:  No      [facility] nurse consulted for Pressure Injury (Stage 3,4, Unstageable, DTI, NWPT, Complex wounds)and New or Established Ostomies:  No      Primary Nurse eSignature: Electronically signed by Mami Lanier RN on 9/26/22 at 11:23 PM EDT

## 2022-09-27 NOTE — H&P
Hospital Medicine History & Physical    PCP: Elian Tim MD    Date of Admission: 9/26/2022    Date of Service: Pt seen/examined on 9/27/2022     Chief Complaint:    Chief Complaint   Patient presents with    Leg Pain     LLE pain and swelling. History Of Present Illness: The patient is a 60 y.o. female with DM, h/o blood clots, h/o CVA, CKD stage 3b who presents to [facility] with c/o LLE pain and swelling. She states onset of symptoms was last Thursday. [place]. She does have a cough and did feel short of breath. States her right shoulder and RUE is hurting her. Pain is improving. She is out of her diabetes medications. Vitals stable. Labs with CKD, hyperglycemia (glucose up to 610), hyponatremia. Found to have bilateral PE. Admitted to PCU on tele. Pulmonology consulted. Past Medical History:        Diagnosis Date    Cerebral artery occlusion with cerebral infarction ([facility] [address]     Diabetes mellitus ([facility] [address]     Hx of blood clots        Past Surgical History:    History reviewed. No pertinent surgical history. Medications Prior to Admission:    Prior to Admission medications    Medication Sig Start Date End Date Taking?  Authorizing Provider   glimepiride (AMARYL) 4 MG tablet Take 4 mg by mouth every morning (before breakfast)  Patient not taking: Reported on 9/26/2022    Historical Provider, MD   insulin glargine (LANTUS) 100 UNIT/ML injection vial Inject into the skin nightly    Historical Provider, MD   lisinopril (PRINIVIL;ZESTRIL) 10 MG tablet Take 10 mg by mouth daily  Patient not taking: Reported on 9/26/2022    Historical Provider, MD   atorvastatin (LIPITOR) 10 MG tablet Take 10 mg by mouth daily  Patient not taking: Reported on 9/26/2022    Historical Provider, MD   ibuprofen (ADVIL) 200 MG tablet Take 1 tablet by mouth every 6 hours as needed for Pain  Patient not taking: Reported on 9/26/2022 5/4/18   ELENI Porras - CNP       Allergies:  Sulfa antibiotics    Social History:  The patient currently lives at home alone. TOBACCO:   reports that she has never smoked. She uses smokeless tobacco.  ETOH:   reports no history of alcohol use. Family History:   Positive as follows:        Problem Relation Age of Onset    Breast Cancer Mother     Diabetes type 2  Father        REVIEW OF SYSTEMS:       Constitutional: Negative for fever   Respiratory: +dyspnea, cough   Cardiovascular: Negative for chest pain   Gastrointestinal: Negative for vomiting, diarrhea   Genitourinary: Negative for hematuria   Musculoskeletal: +LLE pain, swelling, right shoulder, RUE pain (improved now)  Skin: Negative for rash   Neurological: Negative for syncope   Psychiatric/Behavorial: Negative for anxiety    PHYSICAL EXAM:    /78   Pulse 73   Temp 97.1 °F (36.2 °C) (Axillary)   Resp 16   Ht 5' 6\" (1.676 m)   Wt 173 lb 4 oz (78.6 kg)   SpO2 95%   BMI 27.96 kg/m²     Gen: No distress. Alert. Eyes: PERRL. No sclera icterus. No conjunctival injection. ENT: No discharge. Pharynx clear. Neck: No JVD. No Carotid Bruit. Trachea midline. Resp: No accessory muscle use. No crackles. No wheezes. No rhonchi. CV: Regular rate. Regular rhythm. No murmur. No rub. No edema. GI: Non-tender. Non-distended. No masses. No organomegaly. Normal bowel sounds. No hernia. Skin: Warm and dry. No nodule on exposed extremities. No rash on exposed extremities. M/S: No cyanosis. No joint deformity. No clubbing. LLE swelling  Neuro: Awake. Grossly nonfocal    Psych: Oriented x 3. No anxiety or agitation.      CBC:   Recent Labs     09/26/22  1445 09/27/22  0016 09/27/22  0406   WBC 7.6 6.7 6.1   HGB 13.7 12.4 12.1   HCT 41.1 36.1 35.1*   MCV 89.9 87.5 87.6    147 149     BMP:   Recent Labs     09/26/22  1445 09/27/22  0016 09/27/22  0406   * 130* 133*   K 4.6 4.4 4.1   CL 97* 97* 99   CO2 19* 20* 19*   BUN 27* 27* 24*   CREATININE 1.9* 1.8* 1.7*     LIVER PROFILE:   Recent Labs     09/26/22  1445   AST 15   ALT 13   BILITOT 0.4   ALKPHOS 104     PT/INR:   Recent Labs     09/26/22  1445   PROTIME 13.1   INR 1.00     APTT:   Recent Labs     09/26/22  1445   APTT 27.4          CARDIAC ENZYMES  Recent Labs     09/26/22  1445 09/26/22  2250 09/27/22  0406   TROPONINI <0.01 <0.01 <0.01       CULTURES  COVID/influenza: pending     EKG:  I have reviewed the EKG with the following interpretation:   Normal sinus rhythm, Possible Left atrial enlargement. Left axis deviation, Inferior infarct , age undetermined, Anterior infarct , age undetermined, Nonspecific ST abnormality    RADIOLOGY  CT CHEST PULMONARY EMBOLISM W CONTRAST   Final Result   Filling defects in the bilateral lower lobes as well as the right middle and   upper lobe pulmonary arterial branches, consistent with pulmonary embolism. No evidence of right heart strain. Findings were discussed with Jennifer Cox at 4:05 pm on 9/26/2022. VL DUP LOWER EXTREMITY VENOUS LEFT    (Results Pending)         Principal Problem:    Bilateral pulmonary embolism (HCC)  Resolved Problems:    * No resolved hospital problems. *        ASSESSMENT/PLAN:  Bilateral PE  -admitted to PCU on tele  -pulmonology consulted  -serial troponin negative  -doppler pending  -Heparin drip  -Will try to D/c later this afternoon on Eliquis    Hyponatremia  -due to hyperglycemia  -improved with correction of Glucose. DM type 2  -with hyperglycemia  -ran out of diabetes medications at home  -monitored glucose  -Lantus 25 units nightly, SSI coverage. Hyperlipidemia   -on Atorvastatin    CKD stage 3b  -stable  -F/w Dr. Nargis Mejia. DVT Prophylaxis: Heparin drip  Diet: ADULT DIET;  Regular; 4 carb choices (60 gm/meal)  Code Status: Full Code    [facility]  9/27/2022

## 2022-09-27 NOTE — PLAN OF CARE
Problem: Discharge Planning  Goal: Discharge to home or other facility with appropriate resources  9/27/2022 0827 by Moncho Dunbar RN  Outcome: Progressing  9/27/2022 0052 by Eda Amaya RN  Outcome: Progressing  Flowsheets (Taken 9/26/2022 2243)  Discharge to home or other facility with appropriate resources:   Identify barriers to discharge with patient and caregiver   Identify discharge learning needs (meds, wound care, etc)   Refer to discharge planning if patient needs post-hospital services based on physician order or complex needs related to functional status, cognitive ability or social support system   Arrange for needed discharge resources and transportation as appropriate     Problem: Safety - Adult  Goal: Free from fall injury  9/27/2022 0827 by Moncho Dunbar RN  Outcome: Progressing  9/27/2022 0052 by Eda Amaya RN  Outcome: Progressing     Problem: ABCDS Injury Assessment  Goal: Absence of physical injury  9/27/2022 0827 by Moncho Dunbar RN  Outcome: Progressing  9/27/2022 0052 by Eda Amaya RN  Outcome: Progressing

## 2022-09-27 NOTE — PROGRESS NOTES
Out Patient pharmacy brought up medication but pt. Insulin needs refrigerated. Meds signed for and placed in omni cell refrigerator by 328.

## 2022-09-28 VITALS
WEIGHT: 175 LBS | OXYGEN SATURATION: 98 % | HEART RATE: 68 BPM | HEIGHT: 66 IN | DIASTOLIC BLOOD PRESSURE: 72 MMHG | TEMPERATURE: 98.1 F | BODY MASS INDEX: 28.12 KG/M2 | SYSTOLIC BLOOD PRESSURE: 128 MMHG | RESPIRATION RATE: 16 BRPM

## 2022-09-28 LAB
ANION GAP SERPL CALCULATED.3IONS-SCNC: 10 MMOL/L (ref 3–16)
ANTI-XA UNFRAC HEPARIN: 0.42 IU/ML (ref 0.3–0.7)
BASOPHILS ABSOLUTE: 0.1 K/UL (ref 0–0.2)
BASOPHILS RELATIVE PERCENT: 1.1 %
BUN BLDV-MCNC: 27 MG/DL (ref 7–20)
CALCIUM SERPL-MCNC: 8.3 MG/DL (ref 8.3–10.6)
CHLORIDE BLD-SCNC: 111 MMOL/L (ref 99–110)
CO2: 18 MMOL/L (ref 21–32)
CREAT SERPL-MCNC: 1.6 MG/DL (ref 0.6–1.2)
EOSINOPHILS ABSOLUTE: 0.3 K/UL (ref 0–0.6)
EOSINOPHILS RELATIVE PERCENT: 6.5 %
GFR AFRICAN AMERICAN: 39
GFR NON-AFRICAN AMERICAN: 33
GLUCOSE BLD-MCNC: 130 MG/DL (ref 70–99)
GLUCOSE BLD-MCNC: 189 MG/DL (ref 70–99)
GLUCOSE BLD-MCNC: 197 MG/DL (ref 70–99)
GLUCOSE BLD-MCNC: 216 MG/DL (ref 70–99)
HCT VFR BLD CALC: 34.9 % (ref 36–48)
HEMOGLOBIN: 11.6 G/DL (ref 12–16)
LV EF: 53 %
LVEF MODALITY: NORMAL
LYMPHOCYTES ABSOLUTE: 2.2 K/UL (ref 1–5.1)
LYMPHOCYTES RELATIVE PERCENT: 41.7 %
MCH RBC QN AUTO: 29.7 PG (ref 26–34)
MCHC RBC AUTO-ENTMCNC: 33.3 G/DL (ref 31–36)
MCV RBC AUTO: 89.2 FL (ref 80–100)
MONOCYTES ABSOLUTE: 0.4 K/UL (ref 0–1.3)
MONOCYTES RELATIVE PERCENT: 8.3 %
NEUTROPHILS ABSOLUTE: 2.3 K/UL (ref 1.7–7.7)
NEUTROPHILS RELATIVE PERCENT: 42.4 %
PDW BLD-RTO: 12.5 % (ref 12.4–15.4)
PERFORMED ON: ABNORMAL
PLATELET # BLD: 155 K/UL (ref 135–450)
PMV BLD AUTO: 10.2 FL (ref 5–10.5)
POTASSIUM REFLEX MAGNESIUM: 4 MMOL/L (ref 3.5–5.1)
RBC # BLD: 3.91 M/UL (ref 4–5.2)
SODIUM BLD-SCNC: 139 MMOL/L (ref 136–145)
WBC # BLD: 5.3 K/UL (ref 4–11)

## 2022-09-28 PROCEDURE — 80048 BASIC METABOLIC PNL TOTAL CA: CPT

## 2022-09-28 PROCEDURE — 6370000000 HC RX 637 (ALT 250 FOR IP)

## 2022-09-28 PROCEDURE — 99233 SBSQ HOSP IP/OBS HIGH 50: CPT

## 2022-09-28 PROCEDURE — 85520 HEPARIN ASSAY: CPT

## 2022-09-28 PROCEDURE — 85025 COMPLETE CBC W/AUTO DIFF WBC: CPT

## 2022-09-28 PROCEDURE — 36415 COLL VENOUS BLD VENIPUNCTURE: CPT

## 2022-09-28 PROCEDURE — 93306 TTE W/DOPPLER COMPLETE: CPT

## 2022-09-28 PROCEDURE — 2580000003 HC RX 258

## 2022-09-28 PROCEDURE — 99233 SBSQ HOSP IP/OBS HIGH 50: CPT | Performed by: INTERNAL MEDICINE

## 2022-09-28 RX ORDER — LIDOCAINE 4 G/G
1 PATCH TOPICAL DAILY
Status: DISCONTINUED | OUTPATIENT
Start: 2022-09-28 | End: 2022-09-28 | Stop reason: HOSPADM

## 2022-09-28 RX ADMIN — Medication 10 ML: at 09:31

## 2022-09-28 RX ADMIN — APIXABAN 10 MG: 5 TABLET, FILM COATED ORAL at 09:28

## 2022-09-28 RX ADMIN — SODIUM CHLORIDE: 9 INJECTION, SOLUTION INTRAVENOUS at 08:37

## 2022-09-28 NOTE — FLOWSHEET NOTE
09/27/22 2059   Vital Signs   Temp 99.2 °F (37.3 °C)   Temp Source Oral   Heart Rate 77   Heart Rate Source Monitor   Resp 16   /71   BP Location Left upper arm   BP Method Automatic   MAP (Calculated) 90   Patient Position Semi fowlers   Level of Consciousness 0   MEWS Score 1   Pain Assessment   Pain Assessment None - Denies Pain   Oxygen Therapy   SpO2 97 %   O2 Device None (Room air)   Shift assessment completed. Patient awake in bed, A/Ox4. VSS. Scheduled meds given. Snack provided. Educated patient about diabetes. Patient denies any further needs at this time. Call light within reach.

## 2022-09-28 NOTE — FLOWSHEET NOTE
09/28/22 0915   Vital Signs   Temp 98.1 °F (36.7 °C)   Temp Source Oral   Heart Rate 68   Heart Rate Source Monitor   Resp 16   /72   MAP (Calculated) 90.67   Patient Position High fowlers   Level of Consciousness 0   MEWS Score 1   Oxygen Therapy   SpO2 98 %   O2 Device None (Room air)   Pt. Resting in bed. Call light in reach. Shift assessment completed see flow sheet. Denies any needs at this time. Will continue to monitor.

## 2022-09-28 NOTE — CARE COORDINATION
INTERDISCIPLINARY PLAN OF CARE CONFERENCE    Date/Time: 9/28/2022 9:59 AM  Completed by: Elena Gant RN, Case Management      Patient Name:  Abhishek Lew  YOB: 1960  Admitting Diagnosis: Metabolic acidosis [W52.1]  Bilateral pulmonary embolism (HCC) [I26.99]  Acute deep vein thrombosis (DVT) of left lower extremity, unspecified vein ([facility] [address] [I82.402]  Multiple subsegmental pulmonary emboli without acute cor pulmonale ([facility] [address] [I26.94]  Hyperglycemia due to diabetes mellitus ([facility] [address] [E11.65]     Admit Date/Time:  9/26/2022  2:23 PM    Chart reviewed. Interdisciplinary team contacted or reviewed plan related to patient progress and discharge plans. Disciplines included Case Management, Nursing, and Dietitian. Current Status:09/26/2022  PT/OT recommendation for discharge plan of care: NA    Expected D/C Disposition:  Home    Discharge Plan Comments: Chart review. IPTA. Will DC home. RX x 6 sent to [address] [place] [address] to be filled.   $0 co-pay for [employer].      Home O2 in place on admit: No  Pt informed of need to bring portable home O2 tank on day of discharge for nursing to connect prior to leaving:  No  Verbalized agreement/Understanding:  No

## 2022-09-28 NOTE — DISCHARGE INSTR - COC
Continuity of Care Form    Patient Name: John Santos   :  1960  MRN:  [id number]    Admit date:  2022  Discharge date:  ***    Code Status Order: Full Code   Advance Directives:     Admitting Physician:  Sandra Davis MD  PCP: Naomie Braswell MD    Discharging Nurse: [facility] Unit/Room#: /6663-57  Discharging Unit Phone Number: ***    Emergency Contact:   Extended Emergency Contact Information  Primary Emergency Contact: Keiko Mallory of [address] [place] [address] Phone: [phone]  Relation: None  Secondary Emergency Contact: Kb Amaya  [place] Phone: [phone]  Relation: Brother/Sister  Preferred language: English   needed? No    Past Surgical History:  History reviewed. No pertinent surgical history.     Immunization History:   Immunization History   Administered Date(s) Administered    COVID-19, J&J, (age 18y+), IM, 0.5 mL 2021       Active Problems:  Patient Active Problem List   Diagnosis Code    Multiple subsegmental pulmonary emboli without acute cor pulmonale (HCC) I26.94    Acute deep vein thrombosis (DVT) of left lower extremity (HCC) I82.402    Hyperglycemia due to diabetes mellitus ([facility] [address] E11.65    Hyponatremia E87.1    Stage 3b chronic kidney disease ([facility] [address] [facility] N18.32    Heart murmur R01.1    Leg edema R60.0       Isolation/Infection:   Isolation            No Isolation          Patient Infection Status       Infection Onset Added Last Indicated Last Indicated By Review Planned Expiration Resolved Resolved By    None active    Resolved    COVID-19 (Rule Out) 22 COVID-19 & Influenza Combo (Ordered)   22 Rule-Out Test Resulted            Nurse Assessment:  Last Vital Signs: /72   Pulse 68   Temp 98.1 °F (36.7 °C) (Oral)   Resp 16   Ht 5' 6\" (1.676 m)   Wt 175 lb (79.4 kg)   SpO2 98%   BMI 28.25 kg/m²     Last documented pain score (0-10 scale):    Last Weight:   Wt Readings from Last 1 Encounters:   22 175 lb (79.4 kg)     Mental Status:  {IP PT MENTAL STATUS:62501}    IV Access:  { QUAN IV ACCESS:875404851}    Nursing Mobility/ADLs:  Walking   {CHP DME QYIE:298243298}  Transfer  {CHP DME MXT}  Bathing  {CHP DME AXGC:887917572}  Dressing  {CHP DME PHQH:244977470}  Toileting  {CHP DME ZDSR:617243685}  Feeding  {CHP DME EFMS:759939958}  Med Admin  {CHP DME XNFV:148488822}  Med Delivery   { QUAN MED Delivery:369236682}    Wound Care Documentation and Therapy:        Elimination:  Continence: Bowel: {YES / HE:36426}  Bladder: {YES / XL:72577}  Urinary Catheter: {Urinary Catheter:844620957}   Colostomy/Ileostomy/Ileal Conduit: {YES / QN:80296}       Date of Last BM: ***    Intake/Output Summary (Last 24 hours) at 2022 1154  Last data filed at 2022 0931  Gross per 24 hour   Intake 725 ml   Output 1650 ml   Net -925 ml     I/O last 3 completed shifts:   In: 36 [P.O.:715]  Out: 1450 [Urine:1450]    Safety Concerns:     [address] Safety Concerns:682410987}    Impairments/Disabilities:      [address] Impairments/Disabilities:461847991}    Nutrition Therapy:  Current Nutrition Therapy:   [address] Diet List:398782877}    Routes of Feeding: {[facility] DME Other Feedings:031735454}  Liquids: {Slp liquid thickness:95236}  Daily Fluid Restriction: {CHP DME Yes amt example:648969005}  Last Modified Barium Swallow with Video (Video Swallowing Test): {Done Not Done HGJO:013482022}    Treatments at the Time of Hospital Discharge:   Respiratory Treatments: ***  Oxygen Therapy:  {Therapy; copd oxygen:32985}  Ventilator:    { CC Vent HOKM:766989301}    Rehab Therapies: {THERAPEUTIC INTERVENTION:9845852154}  Weight Bearing Status/Restrictions: [employer]  Weight Bearin}  Other Medical Equipment (for information only, NOT a DME order):  {EQUIPMENT:111758888}  Other Treatments: ***    Patient's personal belongings (please select all that are sent with patient):  {[facility] DME Belongings:966186440}    RN SIGNATURE: {Esignature:344371423}    CASE MANAGEMENT/SOCIAL WORK SECTION    Inpatient Status Date: ***    Readmission Risk Assessment Score:  Readmission Risk              Risk of Unplanned Readmission:  15           Discharging to Facility/ Agency   Name:   Address:  Phone:  Fax:    Dialysis Facility (if applicable)   Name:  Address:  Dialysis Schedule:  Phone:  Fax:    / signature: {Esignature:280882501}    PHYSICIAN SECTION    Prognosis: {Prognosis:4868486343}    Condition at Discharge: [address] [place] [address] Patient Condition:266235572}    Rehab Potential (if transferring to Rehab): {Prognosis:1185144606}    Recommended Labs or Other Treatments After Discharge: ***    Physician Certification: I certify the above information and transfer of Miky Harris  is necessary for the continuing treatment of the diagnosis listed and that she requires {Admit to Appropriate Level of Care:47089} for {GREATER/LESS:406975029} 30 days.      Update Admission H&P: {CHP DME Changes in QSKZR:453771263}    PHYSICIAN SIGNATURE:  {Esignature:497822655}

## 2022-09-28 NOTE — PROGRESS NOTES
Patient educated on discharge instructions as well as new medications use, dosage, administration and possible side effects. Patient verified knowledge. IV removed without difficulty and dry dressing in place. Telemetry monitor removed and returned to [facility]. Pt left facility in stable condition to Home with all of their personal belongings.

## 2022-09-28 NOTE — PROGRESS NOTES
Pharmacy - RE:  High-dose Heparin drip  Current rate = 11.4 ml/h  (1140 units/h)  Anti-Xa drawn @ 0435 = 0.42 IU/ml  Goal Anti-Xa = 0.3 - 0.7 IU/ml  Per protocol, continue current rate and obtain another Anti-Xa 9/28 @ 0600.     Selina AmesD, [facility], 9/28/2022 5:50 AM

## 2022-09-28 NOTE — FLOWSHEET NOTE
09/28/22 0226   Vital Signs   Temp 97.6 °F (36.4 °C)   Temp Source Oral   Heart Rate 73   Heart Rate Source Monitor   Resp 16   BP (!) 150/76   BP Location Left upper arm   BP Method Automatic   MAP (Calculated) 100.67   Patient Position High fowlers   Level of Consciousness 0   MEWS Score 1   Oxygen Therapy   SpO2 92 %   O2 Device None (Room air)   Patient in bed with eyes closed. Awakened for vitals. VSS. A/Ox4. Patient denies any further needs. Call light within reach.

## 2022-09-28 NOTE — PROGRESS NOTES
Pulmonary Progress Note    CC: Bilateral PE    Subjective:   Room air  Feels better      Intake/Output Summary (Last 24 hours) at 9/28/2022 0749  Last data filed at 9/28/2022 0739  Gross per 24 hour   Intake 715 ml   Output 2050 ml   Net -1335 ml       Exam:   BP (!) 150/76   Pulse 73   Temp 97.6 °F (36.4 °C) (Oral)   Resp 16   Ht 5' 6\" (1.676 m)   Wt 175 lb (79.4 kg)   SpO2 92%   BMI 28.25 kg/m²  on room air  Gen: No distress. Eyes: PERRL. No sclera icterus. No conjunctival injection. ENT: No discharge. Pharynx clear. Neck: Trachea midline. No obvious mass. Resp: No accessory muscle use. No crackles. No wheezes. No rhonchi. No dullness on percussion. CV: Regular rate. Regular rhythm. No murmur or rub. LLE  edema with calf tenderness-same  GI: Non-tender. Non-distended. No hernia. Skin: Warm and dry. No nodule on exposed extremities. Lymph: No cervical LAD. No supraclavicular LAD. M/S: No cyanosis. No joint deformity. No clubbing. Neuro: Awake. Alert. Moves all four extremities. Psych: Oriented x 3.  No anxiety     Scheduled Meds:   sodium chloride flush  5-40 mL IntraVENous 2 times per day    insulin glargine  25 Units SubCUTAneous Nightly    insulin lispro  0-16 Units SubCUTAneous TID WC    insulin lispro  0-4 Units SubCUTAneous Nightly     Continuous Infusions:   heparin (PORCINE) Infusion 1,140 Units/hr (09/27/22 1614)    dextrose      sodium chloride      sodium chloride 100 mL/hr at 09/27/22 2116     PRN Meds:  heparin (porcine), heparin (porcine), glucose, dextrose bolus **OR** dextrose bolus, glucagon (rDNA), dextrose, sodium chloride flush, sodium chloride, ondansetron **OR** ondansetron, polyethylene glycol, acetaminophen **OR** acetaminophen, potassium chloride **OR** potassium alternative oral replacement **OR** potassium chloride, magnesium sulfate    Labs:  CBC:   Recent Labs     09/27/22  0016 09/27/22  0406 09/28/22  0435   WBC 6.7 6.1 5.3   HGB 12.4 12.1 11.6*   HCT 36.1 35.1* 34.9*   MCV 87.5 87.6 89.2    149 155     BMP:   Recent Labs     09/27/22  0016 09/27/22  0406 09/28/22  0435   * 133* 139   K 4.4 4.1 4.0   CL 97* 99 111*   CO2 20* 19* 18*   BUN 27* 24* 27*   CREATININE 1.8* 1.7* 1.6*     LIVER PROFILE:   Recent Labs     09/26/22  1445   AST 15   ALT 13   BILITOT 0.4   ALKPHOS 104     PT/INR:   Recent Labs     09/26/22  1445   PROTIME 13.1   INR 1.00     APTT:   Recent Labs     09/26/22  1445   APTT 27.4     UA:No results for input(s): NITRITE, COLORU, PHUR, LABCAST, WBCUA, RBCUA, MUCUS, TRICHOMONAS, YEAST, BACTERIA, CLARITYU, SPECGRAV, LEUKOCYTESUR, UROBILINOGEN, BILIRUBINUR, BLOODU, GLUCOSEU, AMORPHOUS in the last 72 hours. Invalid input(s): KETONESU  No results for input(s): PHART, PWE3RJO, PO2ART in the last 72 hours. Cultures:   9/27 COVID-19 not detected    Films:  CT chest 9/26 imaging was reviewed by me and showed   Filling defects in the bilateral lower lobes as well as the right middle and   upper lobe pulmonary arterial branches, consistent with pulmonary embolism. No evidence of right heart strain     LE Doppler 9/27  Acute deep vein thrombosis involving the left popliteal, gastrocnemius, posterior tibial, peroneal and soleal veins        ASSESSMENT:  Acute bilateral pulmonary embolism-probably provoked by daily 6 hours commute  LLE edema and pain -venous Doppler showed acute DVT  Heart murmur   CKD stage IIIb     PLAN:  Eliquis  Patient is hemodynamically stable and therefore no indication systemic thrombolysis or catheter-based therapies at this time. I recommend 3 months of anticoagulation and then reassessment. Cancer screening for age including colonoscopy, pap smear and mammogram if indicated.      Echocardiogram pending  DC planning is okay with pulmonary referral if able to ambulate on room air  Advised to monitor O2 using pulse oximeter and report any desaturation < 90%

## 2022-09-28 NOTE — DISCHARGE SUMMARY
Name:  Dana Fleming  Room:  /0273-18  MRN:    [id number]    Discharge Summary      This discharge summary is in conjunction with a complete physical exam done on the day of discharge. Discharging Attending Physician: Dr. Mary Alvarado     Discharging Provider: Nathanael Marie PA-C    Admit: 9/26/2022  Discharge:  9/28/2022    HPI taken from admission H&P:    The patient is a 60 y.o. female with DM, h/o blood clots, h/o CVA, CKD stage 3b who presents to [facility] with c/o LLE pain and swelling. She states onset of symptoms was last Thursday. [place] She does have a cough and did feel short of breath. States her right shoulder and RUE is hurting her. Pain is improving. She is out of her diabetes medications. Vitals stable. Labs with CKD, hyperglycemia (glucose up to 610), hyponatremia. Found to have bilateral PE. Admitted to PCU on tele. Pulmonology consulted. Diagnoses this Admission and Hospital Course   Bilateral PE  -admitted to PCU on tele  -pulmonology consulted  -serial troponin negative  -doppler pending--> positive for acute DVT of left lower extremity   -Heparin drip  -Will try to D/c later this afternoon on Eliquis  -transitioned to PO eliquis today   -will send home on starter pack     Murmur   Echo with mild aortic stenosis and mitral and tricuspid regurgitation   Make appointment with cardiology for monitoring      Right shoulder pain   -no injury or trauma  -chronic   -likely musculoskeletal in origin   -did not want xray   -lidocaine patch  -can make appointment with orthopedic surgery     Hyponatremia  -due to hyperglycemia  -improved with correction of Glucose.      DM type 2  -with hyperglycemia  -ran out of diabetes medications at home  -monitored glucose  -Lantus 25 units nightly, SSI coverage.   -continue regimen at home  -stressed importance of compliance and monitoring blood sugar at home      Hyperlipidemia   -on Atorvastatin    CKD stage 3b  -stable  -F/w Dr. Velma Nash    Procedures (Please Review Full Report for Details)  N/A    Consults    Pulmonology       Physical Exam at Discharge:    /72   Pulse 68   Temp 98.1 °F (36.7 °C) (Oral)   Resp 16   Ht 5' 6\" (1.676 m)   Wt 175 lb (79.4 kg)   SpO2 98%   BMI 28.25 kg/m²     Gen: No distress. Alert. Elderly female   Eyes: PERRL. No sclera icterus. No conjunctival injection. Neck: No JVD. Trachea midline. Resp: No accessory muscle use. No crackles. No wheezes. No rhonchi. CV: Regular rate. Regular rhythm. ++ murmur. No rub. Peripheral Pulses: +2 palpable, equal bilaterally   GI: Non-tender. Non-distended. Normal bowel sounds. Skin: Warm and dry. No nodule on exposed extremities. No rash on exposed extremities. +left lower extremity swelling and warmth   M/S: No cyanosis. No joint deformity. No clubbing. Neuro: Awake. Grossly nonfocal    Psych: Oriented x 3. No anxiety or agitation. Lab Results   Component Value Date    WBC 5.3 09/28/2022    HGB 11.6 (L) 09/28/2022    HCT 34.9 (L) 09/28/2022    MCV 89.2 09/28/2022     09/28/2022     Lab Results   Component Value Date     09/28/2022    K 4.0 09/28/2022     (H) 09/28/2022    CO2 18 (L) 09/28/2022    BUN 27 (H) 09/28/2022    CREATININE 1.6 (H) 09/28/2022    GLUCOSE 189 (H) 09/28/2022    CALCIUM 8.3 09/28/2022    PROT 7.2 09/26/2022    LABALBU 3.7 09/26/2022    BILITOT 0.4 09/26/2022    ALKPHOS 104 09/26/2022    AST 15 09/26/2022    ALT 13 09/26/2022    LABGLOM 33 (A) 09/28/2022    GFRAA 39 (A) 09/28/2022    AGRATIO 1.1 09/26/2022       CULTURES  COVID and Flu not detected     RADIOLOGY  VL Extremity Venous Left   Final Result      CT CHEST PULMONARY EMBOLISM W CONTRAST   Final Result   Filling defects in the bilateral lower lobes as well as the right middle and   upper lobe pulmonary arterial branches, consistent with pulmonary embolism. No evidence of right heart strain.       Findings were discussed with Nadia Ross at 4:05 pm on 9/26/2022. Discharge Medications     Medication List        START taking these medications      apixaban starter pack 5 MG Tbpk tablet  Commonly known as: ELIQUIS  Take 1 tablet by mouth See Admin Instructions     B-D ULTRAFINE III SHORT PEN 31G X 8 MM Misc  Generic drug: Insulin Pen Needle  1 each by Does not apply route daily     blood glucose test strips  Test 2 times a day & as needed for symptoms of irregular blood glucose. Dispense sufficient amount for indicated testing frequency plus additional to accommodate PRN testing needs. glucose monitoring kit  1 kit by Does not apply route daily     Lancets Misc  1 each by Does not apply route 2 times daily     Lantus SoloStar 100 UNIT/ML injection pen  Generic drug: insulin glargine  Inject 25 Units into the skin nightly  Replaces: insulin glargine 100 UNIT/ML injection vial            STOP taking these medications      atorvastatin 10 MG tablet  Commonly known as: LIPITOR     glimepiride 4 MG tablet  Commonly known as: AMARYL     ibuprofen 200 MG tablet  Commonly known as: Advil     insulin glargine 100 UNIT/ML injection vial  Commonly known as: LANTUS  Replaced by: Lantus SoloStar 100 UNIT/ML injection pen     lisinopril 10 MG tablet  Commonly known as: PRINIVIL;ZESTRIL               Where to Get Your Medications        These medications were sent to [address] [place] [address], [address] [place], [place] [id number]      Phone: [phone]   apixaban starter pack 5 MG Tbpk tablet  B-D ULTRAFINE III SHORT PEN 31G X 8 MM Misc  blood glucose test strips  glucose monitoring kit  Lancets Misc  Lantus SoloStar 100 UNIT/ML injection pen           Discharged in stable condition to home     Follow Up:   Follow up with PCP in 1 week and make appointment with cardiology and orthopedic surgery   Follow up with nephrology Mehnaz [place]  09/28/22  12:31 PM

## 2022-09-28 NOTE — PLAN OF CARE
Problem: Discharge Planning  Goal: Discharge to home or other facility with appropriate resources  9/28/2022 0921 by Emmanuel Dennis RN  Outcome: Progressing  9/27/2022 2253 by Geeta Braxton RN  Outcome: Progressing     Problem: Safety - Adult  Goal: Free from fall injury  9/28/2022 0921 by Emmanuel Dennis RN  Outcome: Progressing  9/27/2022 2253 by Geeta Braxton RN  Outcome: Progressing     Problem: ABCDS Injury Assessment  Goal: Absence of physical injury  9/28/2022 0921 by Emmanuel Dennis RN  Outcome: Progressing  9/27/2022 2253 by Geeta Braxton RN  Outcome: Progressing     Problem: Chronic Conditions and Co-morbidities  Goal: Patient's chronic conditions and co-morbidity symptoms are monitored and maintained or improved  9/28/2022 0921 by Emmanuel Dennis RN  Outcome: Progressing  9/27/2022 2253 by Geeta Braxton RN  Outcome: Progressing

## 2022-09-28 NOTE — PLAN OF CARE
Problem: Discharge Planning  Goal: Discharge to home or other facility with appropriate resources  9/28/2022 1229 by Danii Don RN  Outcome: Completed  9/28/2022 0921 by Danii Don RN  Outcome: Progressing  9/27/2022 2253 by Shea Bajwa RN  Outcome: Progressing     Problem: Safety - Adult  Goal: Free from fall injury  9/28/2022 1229 by Danii Don RN  Outcome: Completed  9/28/2022 0921 by Danii Don RN  Outcome: Progressing  9/27/2022 2253 by Shea Bajwa RN  Outcome: Progressing     Problem: ABCDS Injury Assessment  Goal: Absence of physical injury  9/28/2022 1229 by Danii Don RN  Outcome: Completed  9/28/2022 0921 by Danii Don RN  Outcome: Progressing  9/27/2022 2253 by Shea Bajaw RN  Outcome: Progressing     Problem: Chronic Conditions and Co-morbidities  Goal: Patient's chronic conditions and co-morbidity symptoms are monitored and maintained or improved  9/28/2022 1229 by Danii Don RN  Outcome: Completed  9/28/2022 0921 by Danii Don RN  Outcome: Progressing  9/27/2022 2253 by Shea Bajwa RN  Outcome: Progressing

## 2022-09-29 NOTE — PROGRESS NOTES
Pt called with questions related to home medications. Reviewed MAR and AVS. Lantus HS to start tonight. Reviewed glucometer instructions-pt gives verbal understanding as she used machine during conversation. Eliquis instructions not clear on discharge paperwork, Naye Christian opened the box and we reviewed the instructions to take 2-5 mg in the morning and evening week 1 and 1-5 mg in the morning and evening week 2. Pt does have PCP appointment in 2 weeks and will bring the Eliquis to review with PCP for clarification of week 3 and further. No other questions at this time. Pt very pleasant and appreciative of phone call follow up.  Sigrid Alcantar Clinical